# Patient Record
Sex: MALE | Race: WHITE | NOT HISPANIC OR LATINO | Employment: OTHER | URBAN - METROPOLITAN AREA
[De-identification: names, ages, dates, MRNs, and addresses within clinical notes are randomized per-mention and may not be internally consistent; named-entity substitution may affect disease eponyms.]

---

## 2019-03-30 ENCOUNTER — HOSPITAL ENCOUNTER (EMERGENCY)
Facility: MEDICAL CENTER | Age: 29
End: 2019-03-30
Attending: EMERGENCY MEDICINE

## 2019-03-30 ENCOUNTER — APPOINTMENT (OUTPATIENT)
Dept: RADIOLOGY | Facility: MEDICAL CENTER | Age: 29
End: 2019-03-30
Attending: EMERGENCY MEDICINE

## 2019-03-30 VITALS
BODY MASS INDEX: 23.9 KG/M2 | SYSTOLIC BLOOD PRESSURE: 138 MMHG | RESPIRATION RATE: 19 BRPM | WEIGHT: 139.99 LBS | TEMPERATURE: 98.4 F | OXYGEN SATURATION: 98 % | DIASTOLIC BLOOD PRESSURE: 80 MMHG | HEIGHT: 64 IN | HEART RATE: 81 BPM

## 2019-03-30 DIAGNOSIS — E86.0 DEHYDRATION: ICD-10-CM

## 2019-03-30 DIAGNOSIS — S06.0X1A CONCUSSION WITH LOSS OF CONSCIOUSNESS OF 30 MINUTES OR LESS, INITIAL ENCOUNTER: ICD-10-CM

## 2019-03-30 DIAGNOSIS — F10.920 ALCOHOLIC INTOXICATION WITHOUT COMPLICATION (HCC): ICD-10-CM

## 2019-03-30 DIAGNOSIS — R04.0 NOSEBLEED: ICD-10-CM

## 2019-03-30 DIAGNOSIS — G40.909 NONINTRACTABLE EPILEPSY WITHOUT STATUS EPILEPTICUS, UNSPECIFIED EPILEPSY TYPE (HCC): ICD-10-CM

## 2019-03-30 DIAGNOSIS — Z87.828 HISTORY OF TRAUMATIC HEAD INJURY: ICD-10-CM

## 2019-03-30 LAB
ALBUMIN SERPL BCP-MCNC: 4.6 G/DL (ref 3.2–4.9)
ALBUMIN/GLOB SERPL: 1.7 G/DL
ALP SERPL-CCNC: 42 U/L (ref 30–99)
ALT SERPL-CCNC: 23 U/L (ref 2–50)
ANION GAP SERPL CALC-SCNC: 15 MMOL/L (ref 0–11.9)
APTT PPP: 28.3 SEC (ref 24.7–36)
AST SERPL-CCNC: 28 U/L (ref 12–45)
BASOPHILS # BLD AUTO: 0 % (ref 0–1.8)
BASOPHILS # BLD: 0 K/UL (ref 0–0.12)
BILIRUB SERPL-MCNC: 0.3 MG/DL (ref 0.1–1.5)
BUN SERPL-MCNC: 18 MG/DL (ref 8–22)
CALCIUM SERPL-MCNC: 9.2 MG/DL (ref 8.5–10.5)
CHLORIDE SERPL-SCNC: 108 MMOL/L (ref 96–112)
CO2 SERPL-SCNC: 17 MMOL/L (ref 20–33)
CREAT SERPL-MCNC: 1.11 MG/DL (ref 0.5–1.4)
EOSINOPHIL # BLD AUTO: 0.51 K/UL (ref 0–0.51)
EOSINOPHIL NFR BLD: 4.4 % (ref 0–6.9)
ERYTHROCYTE [DISTWIDTH] IN BLOOD BY AUTOMATED COUNT: 44 FL (ref 35.9–50)
GLOBULIN SER CALC-MCNC: 2.7 G/DL (ref 1.9–3.5)
GLUCOSE SERPL-MCNC: 95 MG/DL (ref 65–99)
HCT VFR BLD AUTO: 43.8 % (ref 42–52)
HGB BLD-MCNC: 14.8 G/DL (ref 14–18)
INR PPP: 0.93 (ref 0.87–1.13)
LYMPHOCYTES # BLD AUTO: 3.02 K/UL (ref 1–4.8)
LYMPHOCYTES NFR BLD: 26.3 % (ref 22–41)
MANUAL DIFF BLD: NORMAL
MCH RBC QN AUTO: 28.6 PG (ref 27–33)
MCHC RBC AUTO-ENTMCNC: 33.8 G/DL (ref 33.7–35.3)
MCV RBC AUTO: 84.7 FL (ref 81.4–97.8)
MONOCYTES # BLD AUTO: 0.91 K/UL (ref 0–0.85)
MONOCYTES NFR BLD AUTO: 7.9 % (ref 0–13.4)
MORPHOLOGY BLD-IMP: NORMAL
NEUTROPHILS # BLD AUTO: 7.06 K/UL (ref 1.82–7.42)
NEUTROPHILS NFR BLD: 58.8 % (ref 44–72)
NEUTS BAND NFR BLD MANUAL: 2.6 % (ref 0–10)
NRBC # BLD AUTO: 0 K/UL
NRBC BLD-RTO: 0 /100 WBC
PLATELET # BLD AUTO: 220 K/UL (ref 164–446)
PLATELET BLD QL SMEAR: NORMAL
PMV BLD AUTO: 9.4 FL (ref 9–12.9)
POTASSIUM SERPL-SCNC: 3.6 MMOL/L (ref 3.6–5.5)
PROT SERPL-MCNC: 7.3 G/DL (ref 6–8.2)
PROTHROMBIN TIME: 12.5 SEC (ref 12–14.6)
RBC # BLD AUTO: 5.17 M/UL (ref 4.7–6.1)
RBC BLD AUTO: NORMAL
SODIUM SERPL-SCNC: 140 MMOL/L (ref 135–145)
WBC # BLD AUTO: 11.5 K/UL (ref 4.8–10.8)

## 2019-03-30 PROCEDURE — 70450 CT HEAD/BRAIN W/O DYE: CPT

## 2019-03-30 PROCEDURE — 36415 COLL VENOUS BLD VENIPUNCTURE: CPT

## 2019-03-30 PROCEDURE — 85007 BL SMEAR W/DIFF WBC COUNT: CPT

## 2019-03-30 PROCEDURE — 80053 COMPREHEN METABOLIC PANEL: CPT

## 2019-03-30 PROCEDURE — 85027 COMPLETE CBC AUTOMATED: CPT

## 2019-03-30 PROCEDURE — 70486 CT MAXILLOFACIAL W/O DYE: CPT

## 2019-03-30 PROCEDURE — 99285 EMERGENCY DEPT VISIT HI MDM: CPT

## 2019-03-30 PROCEDURE — 85730 THROMBOPLASTIN TIME PARTIAL: CPT

## 2019-03-30 PROCEDURE — 85610 PROTHROMBIN TIME: CPT

## 2019-03-30 PROCEDURE — 700105 HCHG RX REV CODE 258: Performed by: EMERGENCY MEDICINE

## 2019-03-30 RX ORDER — SODIUM CHLORIDE, SODIUM LACTATE, POTASSIUM CHLORIDE, CALCIUM CHLORIDE 600; 310; 30; 20 MG/100ML; MG/100ML; MG/100ML; MG/100ML
1000 INJECTION, SOLUTION INTRAVENOUS ONCE
Status: COMPLETED | OUTPATIENT
Start: 2019-03-30 | End: 2019-03-30

## 2019-03-30 RX ADMIN — SODIUM CHLORIDE, POTASSIUM CHLORIDE, SODIUM LACTATE AND CALCIUM CHLORIDE 1000 ML: 600; 310; 30; 20 INJECTION, SOLUTION INTRAVENOUS at 01:19

## 2019-03-30 ASSESSMENT — LIFESTYLE VARIABLES: DO YOU DRINK ALCOHOL: YES

## 2019-03-30 NOTE — DISCHARGE INSTRUCTIONS
You were seen and evaluated in the Emergency Department at [facility] for:     Head injury, nosebleed.     You had the following tests and studies:    Thankfully your brain and face scans and blood tests are normal! However, you likely suffered a concussion.     You received the following medications:    IV fluids.   Take tylenol up to three times per day for the next three days for pain.   ----------------------------    Please make sure to follow up with:    RAUL to set up a primary care provider for recheck and routine health care.  Rest up the next two days.  Return to the ER for any new or worse pain, vision changes, vomiting, or any other new/worse symptoms.  Good luck, we hope you get better soon!  ----------------------------    We always encourage patients to return IMMEDIATELY if they have:  Increased or changing pain, passing out, fevers over 100.4 (taken in your mouth or rectally) for more than 2 days, redness or swelling of skin or tissues, feeling like your heart is beating fast, chest pain that is new or worsening, trouble breathing, feeling like your throat is closing up and can not breath, inability to walk, weakness of any part of your body, new dizziness, severe bleeding that won't stop from any part of your body, if you can't eat or drink, or if you have any other concerns.   If you feel worse, please know that you can always return with any questions, concerns, worse symptoms, or you are feeling unsafe. We certainly cannot say for sure that we have ruled out every illness or dangerous disease, but we feel that at this specific time, your exam, tests, and vital signs like heart rate and blood pressure are safe for discharge.

## 2019-03-30 NOTE — ED NOTES
PIV removed and bandaged.  Dane Brown discharged via ambulation independently with steady gait with friend.  Discharge instructions given and reviewed.  All personal belongings in possession.  No questions at this time.
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
## 2019-03-30 NOTE — ED TRIAGE NOTES
"Chief Complaint   Patient presents with   • T-5000 FALL     Fall from 3 feet. L ear was bleeding, controlled at this time. No lac noted.   • Alcohol Intoxication     Currently intoxicated.       /80   Pulse 97   Temp 36.2 °C (97.1 °F) (Temporal)   Resp 16   Ht 1.626 m (5' 4\")   Wt 65.8 kg (145 lb)   SpO2 100%   BMI 24.89 kg/m²     Pt comes to ER very intoxicated. Pt fell 3 feet onto carpeted surface. PT was bleeding from nose. Pt reports that \"it feels like a leprechaun is trying to kick its way out of my head\"    Pt roomed immediately.   "

## 2023-11-06 ENCOUNTER — OFFICE VISIT (OUTPATIENT)
Dept: URGENT CARE | Facility: CLINIC | Age: 33
End: 2023-11-06

## 2023-11-06 ENCOUNTER — HOSPITAL ENCOUNTER (EMERGENCY)
Facility: MEDICAL CENTER | Age: 33
End: 2023-11-06
Attending: EMERGENCY MEDICINE

## 2023-11-06 VITALS
SYSTOLIC BLOOD PRESSURE: 139 MMHG | RESPIRATION RATE: 18 BRPM | WEIGHT: 135 LBS | OXYGEN SATURATION: 99 % | BODY MASS INDEX: 23.05 KG/M2 | HEART RATE: 103 BPM | DIASTOLIC BLOOD PRESSURE: 87 MMHG | HEIGHT: 64 IN | TEMPERATURE: 98.8 F

## 2023-11-06 VITALS
RESPIRATION RATE: 18 BRPM | HEART RATE: 100 BPM | DIASTOLIC BLOOD PRESSURE: 80 MMHG | SYSTOLIC BLOOD PRESSURE: 132 MMHG | TEMPERATURE: 97.6 F | OXYGEN SATURATION: 99 %

## 2023-11-06 DIAGNOSIS — R21 RASH OF GENITAL AREA: ICD-10-CM

## 2023-11-06 DIAGNOSIS — N50.9 SCROTAL LESION: ICD-10-CM

## 2023-11-06 DIAGNOSIS — Z72.53 HIGH RISK BISEXUAL BEHAVIOR: ICD-10-CM

## 2023-11-06 DIAGNOSIS — L03.90 CELLULITIS, UNSPECIFIED CELLULITIS SITE: ICD-10-CM

## 2023-11-06 PROCEDURE — 3075F SYST BP GE 130 - 139MM HG: CPT | Performed by: PHYSICIAN ASSISTANT

## 2023-11-06 PROCEDURE — A9270 NON-COVERED ITEM OR SERVICE: HCPCS | Performed by: EMERGENCY MEDICINE

## 2023-11-06 PROCEDURE — 700102 HCHG RX REV CODE 250 W/ 637 OVERRIDE(OP): Performed by: EMERGENCY MEDICINE

## 2023-11-06 PROCEDURE — 3079F DIAST BP 80-89 MM HG: CPT | Performed by: PHYSICIAN ASSISTANT

## 2023-11-06 PROCEDURE — 99203 OFFICE O/P NEW LOW 30 MIN: CPT | Performed by: PHYSICIAN ASSISTANT

## 2023-11-06 PROCEDURE — 99283 EMERGENCY DEPT VISIT LOW MDM: CPT

## 2023-11-06 RX ORDER — CEPHALEXIN 500 MG/1
500 CAPSULE ORAL 4 TIMES DAILY
Qty: 40 CAPSULE | Refills: 0 | Status: ACTIVE | OUTPATIENT
Start: 2023-11-06

## 2023-11-06 RX ORDER — IBUPROFEN 800 MG/1
800 TABLET ORAL EVERY 8 HOURS PRN
Qty: 30 TABLET | Refills: 0 | Status: SHIPPED | OUTPATIENT
Start: 2023-11-06

## 2023-11-06 RX ORDER — DOXYCYCLINE 100 MG/1
100 TABLET ORAL ONCE
Status: COMPLETED | OUTPATIENT
Start: 2023-11-06 | End: 2023-11-06

## 2023-11-06 RX ORDER — CEPHALEXIN 500 MG/1
500 CAPSULE ORAL ONCE
Status: COMPLETED | OUTPATIENT
Start: 2023-11-06 | End: 2023-11-06

## 2023-11-06 RX ORDER — DOXYCYCLINE 100 MG/1
100 CAPSULE ORAL 2 TIMES DAILY
Qty: 20 CAPSULE | Refills: 0 | Status: ACTIVE | OUTPATIENT
Start: 2023-11-06

## 2023-11-06 RX ADMIN — DOXYCYCLINE 100 MG: 100 TABLET, FILM COATED ORAL at 11:13

## 2023-11-06 RX ADMIN — CEPHALEXIN 500 MG: 500 CAPSULE ORAL at 11:13

## 2023-11-06 ASSESSMENT — ENCOUNTER SYMPTOMS
FEVER: 0
CHILLS: 0
FLANK PAIN: 0

## 2023-11-06 NOTE — ED TRIAGE NOTES
"Dane Brown  32 y.o.    Chief Complaint   Patient presents with    Wound Check     Pt has multiple wounds to his left leg that has been present x many months. Some areas of redness and others with white/yellow discharge.        BP (!) 141/93   Pulse (!) 104   Temp 37 °C (98.6 °F) (Temporal)   Resp 18   Ht 1.626 m (5' 4\")   Wt 61.2 kg (135 lb)   SpO2 98%   BMI 23.17 kg/m²     Pt placed in the lobby and educated to alert staff with any changes or concerns.   "
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
## 2023-11-06 NOTE — ED PROVIDER NOTES
"ED Provider Note    CHIEF COMPLAINT  Chief Complaint   Patient presents with    Wound Check     Pt has multiple wounds to his left leg that has been present x many months. Some areas of redness and others with white/yellow discharge.        EXTERNAL RECORDS REVIEWED  Outpatient Notes   Health Center    HPI/ROS  LIMITATION TO HISTORY   Select: : None  OUTSIDE HISTORIAN(S):  None    Dane Brown is a 32 y.o. male who presents here for evaluation of cellulitis and wounds to his legs, abdomen and back.  Patient states that he has had these small lesions pop up as he had a pilonidal cyst a year ago, and states that he has had intermittent staph infection since that time.  Patient states he was on Bactrim for approximately 9 weeks, but stopped this about a month or 2 ago.  He has no vomiting, fever or chills.  Patient states that he is here to get some additional antibiotics, and see \"what is going on.\"    PAST MEDICAL HISTORY   has a past medical history of ENCEPHALOMALACIA (8/12/2009), Epilepsy, complex partial (HCC) (4/1/09), Lumbar vertebral fracture (Formerly [facility]) (1/1/01), Memory impairment (4/1/09), TBI (traumatic brain injury) (Formerly [facility]) (8/12/2009), and Traumatic brain injury, closed (Formerly [facility]) (4/1/09).    SURGICAL HISTORY   has a past surgical history that includes other (2000) and nephrectomy radical (1/1/01).    FAMILY HISTORY  Family History   Problem Relation Age of Onset    Alcohol/Drug Mother        SOCIAL HISTORY  Social History     Tobacco Use    Smoking status: Every Day    Smokeless tobacco: Never    Tobacco comments:     1/2 ppd   Substance and Sexual Activity    Alcohol use: Yes     Comment: Currently intoxicated    Drug use: No    Sexual activity: Not on file       CURRENT MEDICATIONS  Home Medications       Reviewed by Homer Rivera R.N. (Registered Nurse) on 11/06/23 at 1024  Med List Status: Not Addressed     Medication Last Dose Status   azithromycin (ZITHROMAX) 250 MG TABS  Active   ibuprofen (MOTRIN) " "400 MG TABS  Active                    ALLERGIES  Allergies   Allergen Reactions    Nkda [No Known Drug Allergy]        PHYSICAL EXAM  VITAL SIGNS: /87   Pulse (!) 103   Temp 37.1 °C (98.8 °F) (Temporal)   Resp 18   Ht 1.626 m (5' 4\")   Wt 61.2 kg (135 lb)   SpO2 99%   BMI 23.17 kg/m²    Constitutional: Well developed, well nourished. No acute distress.  HEENT: Normocephalic, atraumatic. Posterior pharynx clear and moist.  Eyes:  EOMI. Normal sclera.  Neck: Supple, Full range of motion, nontender.  Chest/Pulmonary: clear to ausculation. Symmetrical expansion.   Cardio: Regular rate and rhythm with no murmur.   Abdomen: Soft, nontender. No peritoneal signs. No guarding. No palpable masses.  Back: No CVA tenderness, nontender midline, no step offs.  Musculoskeletal: No deformity, no edema, neurovascular intact.   Neuro: Clear speech, appropriate, cooperative, cranial nerves II-XII grossly intact.  Psych: Normal mood and affect  Skin; multiple areas of induration, consistent with small areas of cellulitis.  Consistent around hair follicles as well.      DIAGNOSTIC STUDIES / PROCEDURES  None    RADIOLOGY  None    COURSE & MEDICAL DECISION MAKING    Patient was discharged in stable condition.    INITIAL ASSESSMENT, COURSE AND PLAN  Care Narrative: This is a 32-year-old male here for evaluation of sores to his legs and abdomen.  Patient has had these sores and areas of cellulitis pop up since he had a pilonidal cyst a year ago.  He states he always has trouble getting rid of them, has been seen by his surgeon who has prescribed him Bactrim multiple times.  Patient is nontoxic-appearing afebrile comfortable.  I did speak with pharmacist, Judson, who recommended trying Keflex and Doxy at this time.  Patient also follow-up, with internal medicine for an appointment.  He has a referral for dermatology, but has yet to go.    DISPOSITION AND DISCUSSIONS  I have discussed management of the patient with the following " physicians and KOBE's: Pharmacy    Discussion of management with other QHP or appropriate source(s): Pharmacy        Escalation of care considered, and ultimately not performed:blood analysis.  Indicated at this time as patient is nontoxic-appearing.  He is afebrile    Barriers to care at this time, including but not limited to: .  Transportation    Decision tools and prescription drugs considered including, but not limited to: Antibiotics Keflex and Doxy .    FINAL DIAGNOSIS  1. Cellulitis, unspecified cellulitis site           Electronically signed by: Jose F Salazar D.O., 11/6/2023 1:59 PM

## 2023-11-06 NOTE — ED NOTES
Vu ambulated with steady gait to room, gowned, and placed on monitors (BP, Pulse Ox). Patient is low fall risk. Standard fall risk precautions in place including bed in lowest position, side rails up, call light within reach, and area free of clutter. Chart up for ERP.

## 2023-11-06 NOTE — Clinical Note
Dane Brown was seen and treated in our emergency department on 11/6/2023.  He may return to work on 11/08/2023.       If you have any questions or concerns, please don't hesitate to call.      Jose F Salazar D.O.

## 2023-11-06 NOTE — ED NOTES
Pt discharged, all appropriate hospital equipment removed (IV, monitor, pulse ox, etc.). Pt left unit via walking with stable gait to ED lobby for transport home. Personal belongings with pt when leaving unit. Pt given discharge instructions prior to leaving unit including where to  prescriptions and when to follow-up; verbalizes understanding. Pt informed to return to ED if symptoms worsen/return or altered status develop. Copy of discharge instructions signed and turned into DC basket and copy sent with pt. Patient given work excuse note. ERP approved.